# Patient Record
Sex: MALE | Race: OTHER | HISPANIC OR LATINO
[De-identification: names, ages, dates, MRNs, and addresses within clinical notes are randomized per-mention and may not be internally consistent; named-entity substitution may affect disease eponyms.]

---

## 2017-01-12 ENCOUNTER — MEDICATION RENEWAL (OUTPATIENT)
Age: 52
End: 2017-01-12

## 2017-02-09 ENCOUNTER — TRANSCRIPTION ENCOUNTER (OUTPATIENT)
Age: 52
End: 2017-02-09

## 2017-04-16 ENCOUNTER — TRANSCRIPTION ENCOUNTER (OUTPATIENT)
Age: 52
End: 2017-04-16

## 2017-07-01 ENCOUNTER — TRANSCRIPTION ENCOUNTER (OUTPATIENT)
Age: 52
End: 2017-07-01

## 2017-07-29 ENCOUNTER — TRANSCRIPTION ENCOUNTER (OUTPATIENT)
Age: 52
End: 2017-07-29

## 2017-08-17 ENCOUNTER — APPOINTMENT (OUTPATIENT)
Dept: UROLOGY | Facility: CLINIC | Age: 52
End: 2017-08-17

## 2017-09-04 ENCOUNTER — TRANSCRIPTION ENCOUNTER (OUTPATIENT)
Age: 52
End: 2017-09-04

## 2017-09-23 ENCOUNTER — TRANSCRIPTION ENCOUNTER (OUTPATIENT)
Age: 52
End: 2017-09-23

## 2018-01-04 ENCOUNTER — TRANSCRIPTION ENCOUNTER (OUTPATIENT)
Age: 53
End: 2018-01-04

## 2018-04-20 ENCOUNTER — TRANSCRIPTION ENCOUNTER (OUTPATIENT)
Age: 53
End: 2018-04-20

## 2018-05-13 ENCOUNTER — TRANSCRIPTION ENCOUNTER (OUTPATIENT)
Age: 53
End: 2018-05-13

## 2018-10-08 ENCOUNTER — TRANSCRIPTION ENCOUNTER (OUTPATIENT)
Age: 53
End: 2018-10-08

## 2021-05-12 ENCOUNTER — EMERGENCY (EMERGENCY)
Facility: HOSPITAL | Age: 56
LOS: 1 days | Discharge: HOME | End: 2021-05-12
Attending: EMERGENCY MEDICINE | Admitting: EMERGENCY MEDICINE
Payer: COMMERCIAL

## 2021-05-12 VITALS
DIASTOLIC BLOOD PRESSURE: 73 MMHG | SYSTOLIC BLOOD PRESSURE: 132 MMHG | RESPIRATION RATE: 16 BRPM | OXYGEN SATURATION: 100 % | HEIGHT: 65 IN | HEART RATE: 81 BPM | TEMPERATURE: 98 F

## 2021-05-12 DIAGNOSIS — Z98.89 OTHER SPECIFIED POSTPROCEDURAL STATES: Chronic | ICD-10-CM

## 2021-05-12 LAB
ALBUMIN SERPL ELPH-MCNC: 4 G/DL — SIGNIFICANT CHANGE UP (ref 3.3–5)
ALP SERPL-CCNC: 68 U/L — SIGNIFICANT CHANGE UP (ref 40–120)
ALT FLD-CCNC: 13 U/L — SIGNIFICANT CHANGE UP (ref 4–41)
ANION GAP SERPL CALC-SCNC: 11 MMOL/L — SIGNIFICANT CHANGE UP (ref 7–14)
APPEARANCE UR: CLEAR — SIGNIFICANT CHANGE UP
AST SERPL-CCNC: 20 U/L — SIGNIFICANT CHANGE UP (ref 4–40)
BASOPHILS # BLD AUTO: 0.06 K/UL — SIGNIFICANT CHANGE UP (ref 0–0.2)
BASOPHILS NFR BLD AUTO: 0.7 % — SIGNIFICANT CHANGE UP (ref 0–2)
BILIRUB SERPL-MCNC: 0.8 MG/DL — SIGNIFICANT CHANGE UP (ref 0.2–1.2)
BILIRUB UR-MCNC: NEGATIVE — SIGNIFICANT CHANGE UP
BUN SERPL-MCNC: 15 MG/DL — SIGNIFICANT CHANGE UP (ref 7–23)
CALCIUM SERPL-MCNC: 9 MG/DL — SIGNIFICANT CHANGE UP (ref 8.4–10.5)
CHLORIDE SERPL-SCNC: 100 MMOL/L — SIGNIFICANT CHANGE UP (ref 98–107)
CO2 SERPL-SCNC: 28 MMOL/L — SIGNIFICANT CHANGE UP (ref 22–31)
COLOR SPEC: YELLOW — SIGNIFICANT CHANGE UP
CREAT SERPL-MCNC: 0.88 MG/DL — SIGNIFICANT CHANGE UP (ref 0.5–1.3)
DIFF PNL FLD: NEGATIVE — SIGNIFICANT CHANGE UP
EOSINOPHIL # BLD AUTO: 0.21 K/UL — SIGNIFICANT CHANGE UP (ref 0–0.5)
EOSINOPHIL NFR BLD AUTO: 2.6 % — SIGNIFICANT CHANGE UP (ref 0–6)
GLUCOSE SERPL-MCNC: 106 MG/DL — HIGH (ref 70–99)
GLUCOSE UR QL: NEGATIVE — SIGNIFICANT CHANGE UP
HCT VFR BLD CALC: 41 % — SIGNIFICANT CHANGE UP (ref 39–50)
HGB BLD-MCNC: 14.2 G/DL — SIGNIFICANT CHANGE UP (ref 13–17)
HIV 1+2 AB+HIV1 P24 AG SERPL QL IA: SIGNIFICANT CHANGE UP
IANC: 4.82 K/UL — SIGNIFICANT CHANGE UP (ref 1.5–8.5)
IMM GRANULOCYTES NFR BLD AUTO: 0.4 % — SIGNIFICANT CHANGE UP (ref 0–1.5)
KETONES UR-MCNC: NEGATIVE — SIGNIFICANT CHANGE UP
LEUKOCYTE ESTERASE UR-ACNC: NEGATIVE — SIGNIFICANT CHANGE UP
LYMPHOCYTES # BLD AUTO: 2.2 K/UL — SIGNIFICANT CHANGE UP (ref 1–3.3)
LYMPHOCYTES # BLD AUTO: 27.2 % — SIGNIFICANT CHANGE UP (ref 13–44)
MCHC RBC-ENTMCNC: 32 PG — SIGNIFICANT CHANGE UP (ref 27–34)
MCHC RBC-ENTMCNC: 34.6 GM/DL — SIGNIFICANT CHANGE UP (ref 32–36)
MCV RBC AUTO: 92.3 FL — SIGNIFICANT CHANGE UP (ref 80–100)
MONOCYTES # BLD AUTO: 0.77 K/UL — SIGNIFICANT CHANGE UP (ref 0–0.9)
MONOCYTES NFR BLD AUTO: 9.5 % — SIGNIFICANT CHANGE UP (ref 2–14)
NEUTROPHILS # BLD AUTO: 4.82 K/UL — SIGNIFICANT CHANGE UP (ref 1.8–7.4)
NEUTROPHILS NFR BLD AUTO: 59.6 % — SIGNIFICANT CHANGE UP (ref 43–77)
NITRITE UR-MCNC: NEGATIVE — SIGNIFICANT CHANGE UP
NRBC # BLD: 0 /100 WBCS — SIGNIFICANT CHANGE UP
NRBC # FLD: 0 K/UL — SIGNIFICANT CHANGE UP
PH UR: 6 — SIGNIFICANT CHANGE UP (ref 5–8)
PLATELET # BLD AUTO: 177 K/UL — SIGNIFICANT CHANGE UP (ref 150–400)
POTASSIUM SERPL-MCNC: 3 MMOL/L — LOW (ref 3.5–5.3)
POTASSIUM SERPL-SCNC: 3 MMOL/L — LOW (ref 3.5–5.3)
PROT SERPL-MCNC: 6.8 G/DL — SIGNIFICANT CHANGE UP (ref 6–8.3)
PROT UR-MCNC: ABNORMAL
RBC # BLD: 4.44 M/UL — SIGNIFICANT CHANGE UP (ref 4.2–5.8)
RBC # FLD: 12.5 % — SIGNIFICANT CHANGE UP (ref 10.3–14.5)
SODIUM SERPL-SCNC: 139 MMOL/L — SIGNIFICANT CHANGE UP (ref 135–145)
SP GR SPEC: 1.02 — SIGNIFICANT CHANGE UP (ref 1.01–1.02)
UROBILINOGEN FLD QL: SIGNIFICANT CHANGE UP
WBC # BLD: 8.09 K/UL — SIGNIFICANT CHANGE UP (ref 3.8–10.5)
WBC # FLD AUTO: 8.09 K/UL — SIGNIFICANT CHANGE UP (ref 3.8–10.5)

## 2021-05-12 PROCEDURE — 99284 EMERGENCY DEPT VISIT MOD MDM: CPT | Mod: 25

## 2021-05-12 PROCEDURE — 93010 ELECTROCARDIOGRAM REPORT: CPT

## 2021-05-12 NOTE — ED PROVIDER NOTE - FAMILY HISTORY
Father  Still living? Unknown  Family history of prostate cancer, Age at diagnosis: Age Unknown     Grandparent  Still living? Unknown  Family history of prostate cancer, Age at diagnosis: Age Unknown

## 2021-05-12 NOTE — ED ADULT TRIAGE NOTE - CHIEF COMPLAINT QUOTE
Pt. with c/o RLQ  radiating to belly button. started yesterday on/off and continues since last night. Last normal BM yesterday. denies n/v.

## 2021-05-12 NOTE — ED PROVIDER NOTE - ATTENDING CONTRIBUTION TO CARE
I performed a face to face evaluation of this patient and obtained a history and performed a full exam.  I agree with the history, physical exam and plan of the PA.    Brief HPI:  57 yo M pmh HTN presents to ED with periumbilical abdominal pain radiating to RLQ only in the evening for the past 2 days.  Intermittent, sharp, no aggravating or alleviating factors.  No n/v/f/c, dysuria, hematuria, back pain, diarrhea, bloody or dark stool.      Vitals:   Reviewed    Exam:    GEN:  Non-toxic appearing, non-distressed, speaking full sentences, non-diaphoretic, AAOx3  HEENT:  NCAT, neck supple, EOMI, PERRLA, sclera anicteric, no conjunctival pallor or injection, no stridor, normal voice, no tonsillar exudate  CV:  regular rhythm and rate, s1/s2 audible, no murmurs, rubs or gallops, peripheral pulses 2+ and symmetric  PULM:  non-labored respirations, lungs clear to auscultation bilaterally, no wheezes, crackles or rales  ABD:  non distended, non-tender, no rebound, no guarding, negative Sibley's sign, bowel sounds normal, no cvat  MSK:  no gross deformity, non-tender extremities and joints, range of motion grossly normal appearing, no extremity edema, extremities warm and well perfused   NEURO:  AAOx3, CN II-XII intact, motor 5/5 in upper and lower extremities bilaterally, sensation grossly intact in extremities and trunk  SKIN:  warm, dry, no rash or vesicles   :  See pa note     A/P:  57 yo male c pmhx HTN presents to ED c/o periumbilical abdominal pain radiating to RLQ only in the evening for the past 2 days.  Patient is not experiencing pain currently in ED.  Abdomen is non-tender,  exam unremarkable.  Low c/f acute surgical pathology of abdomen or gu system.  Plan for labs, ua.  Dispo pending.

## 2021-05-12 NOTE — ED PROVIDER NOTE - CLINICAL SUMMARY MEDICAL DECISION MAKING FREE TEXT BOX
55 yo male c pmhx HTN, kidney stones presents to ED c/o periumbilical abdominal pain radiating to RLQ only in the evening for the past 2 days.  Abdomen soft, nontender,  nondistended, pt has no n/v/d or  sx well appearing and in NAD.  No indication for CT scan at this time, will check labs and UA and reassess.

## 2021-05-12 NOTE — ED PROVIDER NOTE - OBJECTIVE STATEMENT
55 yo male c pmhx HTN presents to ED c/o periumbilical abdominal pain radiating to RLQ only in the evening for the past 2 days.  Pt states pain is sharp and he only happens in the evening.  Pt denies any n/v/d, Cp, sob, flank pain, urinary sx.

## 2021-05-12 NOTE — ED PROVIDER NOTE - PMH
Atrial fibrillation  paroxismal  BPH with elevated PSA  Last PSA 1.25  Cardiomegaly    Cardiomyopathy  MILD LVD on ECHO from FEb of 2012  History of tonsillectomy    HTN (hypertension)    Nephrolithiasis    RBBB (right bundle branch block)    Rotator cuff injury  Lt  with surgical correction

## 2021-05-12 NOTE — ED ADULT NURSE NOTE - OBJECTIVE STATEMENT
Received patient to intake spot 10, A&OX4, ambulatory. Pt. c/o sharp intermittent RLQ pain that radiates to the belly button since last night. Pt. denies SOB, chest pain, fevers, chills, n/v, diarrhea, or urinary symptoms. Respirations appear even and unlabored. PHx HTN and kidney stones. Comfort measures in place. Awaiting further orders. Will continue to monitor.

## 2021-05-12 NOTE — ED PROVIDER NOTE - NSFOLLOWUPINSTRUCTIONS_ED_ALL_ED_FT
Drink plenty of fluids,  Avoid any strenuous activity.  Follow up with your PCP in 2 days.  Return to Ed for any worsening pain, vomiting, fever or any other concerning symptoms.

## 2021-05-12 NOTE — ED PROVIDER NOTE - PATIENT PORTAL LINK FT
You can access the FollowMyHealth Patient Portal offered by [facility] by registering at the following website: [email or web address]. By joining [employer]’s FollowMyHealth portal, you will also be able to view your health information using other applications (apps) compatible with our system.

## 2022-10-28 ENCOUNTER — APPOINTMENT (OUTPATIENT)
Dept: UROLOGY | Facility: CLINIC | Age: 57
End: 2022-10-28

## 2022-10-28 VITALS
RESPIRATION RATE: 17 BRPM | DIASTOLIC BLOOD PRESSURE: 82 MMHG | WEIGHT: 205 LBS | SYSTOLIC BLOOD PRESSURE: 130 MMHG | OXYGEN SATURATION: 100 % | HEART RATE: 79 BPM | HEIGHT: 65 IN | BODY MASS INDEX: 34.16 KG/M2

## 2022-10-28 DIAGNOSIS — N20.0 CALCULUS OF KIDNEY: ICD-10-CM

## 2022-10-28 DIAGNOSIS — N40.1 BENIGN PROSTATIC HYPERPLASIA WITH LOWER URINARY TRACT SYMPMS: ICD-10-CM

## 2022-10-28 LAB
BILIRUB UR QL STRIP: NEGATIVE
CLARITY UR: CLEAR
COLLECTION METHOD: NORMAL
GLUCOSE UR-MCNC: NEGATIVE
HCG UR QL: 0.2 EU/DL
HGB UR QL STRIP.AUTO: NEGATIVE
KETONES UR-MCNC: NEGATIVE
LEUKOCYTE ESTERASE UR QL STRIP: NEGATIVE
NITRITE UR QL STRIP: NEGATIVE
PH UR STRIP: 6
PROT UR STRIP-MCNC: NEGATIVE
SP GR UR STRIP: 1.02

## 2022-10-28 PROCEDURE — 99204 OFFICE O/P NEW MOD 45 MIN: CPT

## 2022-10-28 NOTE — HISTORY OF PRESENT ILLNESS
[FreeTextEntry1] : Patient is a 56 yo M who presents for kidney stones and prostate check.\par \par No significant LUTS - except for urinary frequency from drinking lots of water.  He has 2 scanned Litholinks in 2013, 2015 - mainly hypercalciuria.  Normal volume, UA, citrate.\par \par He had 3 URS/ESWLs in the past.  He states he was given triamterene-HCTZ, allopurinol.  He has been taking all along and no further stones.  He was following with urologist near him, Dr Cm.  He does not want to stop taking these two medications.\par \par AUA SS 6, bother 1.  States he has h/o prostatitis in the past.  Family hx +prostate ca in father, treated w brachy.\par \par Currently asymptomatic.

## 2022-10-28 NOTE — ASSESSMENT
[FreeTextEntry1] : Patient is a 58 yo M who presents for kidney stones, BPH.\par \par PSA\par Renal ULT\par Refer to Nephrology/Dr Villatoro for stone mgmt - he would to continue with his triamterene-hctz, allopurinol.  D/w pt that unclear if he needs to be on allopurinol or these medications based on his Litholink.  D/w pt that will renew until he sees Dr Villatoro who is a specialist in stone medical mgmt\par F/u 1 yr

## 2022-10-28 NOTE — REVIEW OF SYSTEMS
[Constipation] : constipation [Diarrhea] : diarrhea [Heartburn] : heartburn [Joint Pain] : joint pain [see HPI] : see HPI [Negative] : Heme/Lymph [FreeTextEntry2] : high blood pressure

## 2022-11-02 ENCOUNTER — APPOINTMENT (OUTPATIENT)
Age: 57
End: 2022-11-02

## 2022-11-02 LAB — PSA SERPL-MCNC: 3.08 NG/ML

## 2022-11-15 ENCOUNTER — APPOINTMENT (OUTPATIENT)
Dept: NEPHROLOGY | Facility: CLINIC | Age: 57
End: 2022-11-15

## 2023-05-10 ENCOUNTER — NON-APPOINTMENT (OUTPATIENT)
Age: 58
End: 2023-05-10

## 2023-07-05 ENCOUNTER — NON-APPOINTMENT (OUTPATIENT)
Age: 58
End: 2023-07-05

## 2023-07-14 ENCOUNTER — NON-APPOINTMENT (OUTPATIENT)
Age: 58
End: 2023-07-14

## 2023-10-31 ENCOUNTER — APPOINTMENT (OUTPATIENT)
Dept: UROLOGY | Facility: CLINIC | Age: 58
End: 2023-10-31

## 2023-12-20 ENCOUNTER — NON-APPOINTMENT (OUTPATIENT)
Age: 58
End: 2023-12-20

## 2024-01-25 ENCOUNTER — NON-APPOINTMENT (OUTPATIENT)
Age: 59
End: 2024-01-25

## 2024-02-27 ENCOUNTER — NON-APPOINTMENT (OUTPATIENT)
Age: 59
End: 2024-02-27

## 2024-05-03 ENCOUNTER — NON-APPOINTMENT (OUTPATIENT)
Age: 59
End: 2024-05-03

## 2024-08-14 ENCOUNTER — NON-APPOINTMENT (OUTPATIENT)
Age: 59
End: 2024-08-14

## 2024-10-19 ENCOUNTER — NON-APPOINTMENT (OUTPATIENT)
Age: 59
End: 2024-10-19

## 2024-12-20 ENCOUNTER — APPOINTMENT (OUTPATIENT)
Dept: UROLOGY | Facility: CLINIC | Age: 59
End: 2024-12-20
Payer: COMMERCIAL

## 2024-12-20 PROCEDURE — G2211 COMPLEX E/M VISIT ADD ON: CPT | Mod: NC

## 2024-12-20 PROCEDURE — 99214 OFFICE O/P EST MOD 30 MIN: CPT

## 2024-12-22 LAB
APPEARANCE: CLEAR
BACTERIA UR CULT: NORMAL
BACTERIA: NEGATIVE /HPF
BILIRUBIN URINE: NEGATIVE
BLOOD URINE: NEGATIVE
CAST: 0 /LPF
COLOR: YELLOW
EPITHELIAL CELLS: 0 /HPF
GLUCOSE QUALITATIVE U: NEGATIVE MG/DL
KETONES URINE: NEGATIVE MG/DL
LEUKOCYTE ESTERASE URINE: ABNORMAL
MICROSCOPIC-UA: NORMAL
NITRITE URINE: NEGATIVE
PH URINE: 7
PROTEIN URINE: NORMAL MG/DL
RED BLOOD CELLS URINE: 1 /HPF
SPECIFIC GRAVITY URINE: 1.02
UROBILINOGEN URINE: 1 MG/DL
WHITE BLOOD CELLS URINE: 1 /HPF

## 2024-12-26 ENCOUNTER — OUTPATIENT (OUTPATIENT)
Dept: OUTPATIENT SERVICES | Facility: HOSPITAL | Age: 59
LOS: 1 days | End: 2024-12-26
Payer: COMMERCIAL

## 2024-12-26 ENCOUNTER — APPOINTMENT (OUTPATIENT)
Dept: CT IMAGING | Facility: IMAGING CENTER | Age: 59
End: 2024-12-26
Payer: COMMERCIAL

## 2024-12-26 DIAGNOSIS — N20.0 CALCULUS OF KIDNEY: ICD-10-CM

## 2024-12-26 DIAGNOSIS — Z98.89 OTHER SPECIFIED POSTPROCEDURAL STATES: Chronic | ICD-10-CM

## 2024-12-26 PROCEDURE — 74176 CT ABD & PELVIS W/O CONTRAST: CPT | Mod: 26

## 2024-12-26 PROCEDURE — 74176 CT ABD & PELVIS W/O CONTRAST: CPT

## 2025-01-12 ENCOUNTER — NON-APPOINTMENT (OUTPATIENT)
Age: 60
End: 2025-01-12

## 2025-01-22 ENCOUNTER — OUTPATIENT (OUTPATIENT)
Dept: OUTPATIENT SERVICES | Facility: HOSPITAL | Age: 60
LOS: 1 days | End: 2025-01-22
Payer: COMMERCIAL

## 2025-01-22 VITALS
RESPIRATION RATE: 16 BRPM | WEIGHT: 190.04 LBS | SYSTOLIC BLOOD PRESSURE: 116 MMHG | OXYGEN SATURATION: 97 % | TEMPERATURE: 98 F | HEART RATE: 93 BPM | DIASTOLIC BLOOD PRESSURE: 72 MMHG | HEIGHT: 65 IN

## 2025-01-22 DIAGNOSIS — N20.0 CALCULUS OF KIDNEY: ICD-10-CM

## 2025-01-22 DIAGNOSIS — I42.9 CARDIOMYOPATHY, UNSPECIFIED: ICD-10-CM

## 2025-01-22 DIAGNOSIS — Z01.818 ENCOUNTER FOR OTHER PREPROCEDURAL EXAMINATION: ICD-10-CM

## 2025-01-22 DIAGNOSIS — Z98.89 OTHER SPECIFIED POSTPROCEDURAL STATES: Chronic | ICD-10-CM

## 2025-01-22 PROCEDURE — 87086 URINE CULTURE/COLONY COUNT: CPT

## 2025-01-22 PROCEDURE — G0463: CPT

## 2025-01-22 RX ORDER — SODIUM CHLORIDE 9 G/ML
1000 INJECTION, SOLUTION INTRAVENOUS
Refills: 0 | Status: DISCONTINUED | OUTPATIENT
Start: 2025-02-12 | End: 2025-02-26

## 2025-01-22 RX ORDER — BACTERIOSTATIC SODIUM CHLORIDE 0.9 %
3 VIAL (ML) INJECTION EVERY 8 HOURS
Refills: 0 | Status: DISCONTINUED | OUTPATIENT
Start: 2025-02-12 | End: 2025-02-26

## 2025-01-22 NOTE — H&P PST ADULT - ASSESSMENT
DASI Score: 6.70  able to go up one flight of stairs or walk 1-2 blocks, Perform all ADLs, shopping, chores, without difficulty, SOB, CP.  Loose teeth: denies loose teeth or dentures.

## 2025-01-22 NOTE — H&P PST ADULT - HISTORY OF PRESENT ILLNESS
60y/o M, PMHx of HTN, GERD, Arthritis, occasional PVCs, RBBB, cardiomyopathy (on ASA 81mg), Hx of kidney stones.  PT went to urgent care 1/12/20245 with c/o right flank pain x 2 months.  Pt was more painful lately and unable to sleep.  Noticed BP elevated when he lays down.  Pain 3/10 more discomfort, achy.  Pt denies any F/C/N/V/D, SOB, CP, palpitations, dizziness, HA, urinary frequency, urgency, dysuria.  Pt scheduled for right ESWL with Dr. Marty Martin on 2/12/2025.

## 2025-01-22 NOTE — H&P PST ADULT - NSICDXPASTSURGICALHX_GEN_ALL_CORE_FT
What Type Of Note Output Would You Prefer (Optional)?: Bullet Format Is This A New Presentation, Or A Follow-Up?: Skin Lesion PAST SURGICAL HISTORY:  H/O arthroscopy of shoulder Right    H/O lithotripsy     History of tonsillectomy

## 2025-01-22 NOTE — H&P PST ADULT - PROBLEM SELECTOR PLAN 1
Pt scheduled for Right ESWlL with Dr. Marty Martin on 2/12/2025.  -Pre op instructions provided.  LABS: urine culture done at [facility].  Blood work received ( 01/14/2025).  May continue on medications as Rx.  PT has appt tomorrow with PMD 1/23/2025 for M/E.

## 2025-01-22 NOTE — H&P PST ADULT - NSICDXPASTMEDICALHX_GEN_ALL_CORE_FT
PAST MEDICAL HISTORY:  Atrial fibrillation paroxismal    BPH with elevated PSA Last PSA 1.25    Cardiomegaly     Cardiomyopathy MILD LVD on ECHO from FEb of 2012    History of tonsillectomy     HTN (hypertension)     Nephrolithiasis     RBBB (right bundle branch block)     Rotator cuff injury Lt  with surgical correction

## 2025-01-22 NOTE — H&P PST ADULT - NSICDXFAMILYHX_GEN_ALL_CORE_FT
FAMILY HISTORY:  Father  Still living? Unknown  Family history of prostate cancer, Age at diagnosis: Age Unknown    Grandparent  Still living? Unknown  Family history of prostate cancer, Age at diagnosis: Age Unknown

## 2025-01-23 LAB
CULTURE RESULTS: SIGNIFICANT CHANGE UP
SPECIMEN SOURCE: SIGNIFICANT CHANGE UP

## 2025-02-08 ENCOUNTER — NON-APPOINTMENT (OUTPATIENT)
Age: 60
End: 2025-02-08

## 2025-02-12 ENCOUNTER — APPOINTMENT (OUTPATIENT)
Dept: UROLOGY | Facility: HOSPITAL | Age: 60
End: 2025-02-12

## 2025-02-12 ENCOUNTER — TRANSCRIPTION ENCOUNTER (OUTPATIENT)
Age: 60
End: 2025-02-12

## 2025-02-12 ENCOUNTER — OUTPATIENT (OUTPATIENT)
Dept: OUTPATIENT SERVICES | Facility: HOSPITAL | Age: 60
LOS: 1 days | End: 2025-02-12
Payer: COMMERCIAL

## 2025-02-12 VITALS
SYSTOLIC BLOOD PRESSURE: 123 MMHG | RESPIRATION RATE: 12 BRPM | HEART RATE: 76 BPM | DIASTOLIC BLOOD PRESSURE: 68 MMHG | OXYGEN SATURATION: 96 %

## 2025-02-12 VITALS
HEART RATE: 77 BPM | SYSTOLIC BLOOD PRESSURE: 122 MMHG | HEIGHT: 65 IN | DIASTOLIC BLOOD PRESSURE: 71 MMHG | TEMPERATURE: 97 F | OXYGEN SATURATION: 97 % | RESPIRATION RATE: 18 BRPM | WEIGHT: 190.04 LBS

## 2025-02-12 DIAGNOSIS — N20.0 CALCULUS OF KIDNEY: ICD-10-CM

## 2025-02-12 DIAGNOSIS — Z98.89 OTHER SPECIFIED POSTPROCEDURAL STATES: Chronic | ICD-10-CM

## 2025-02-12 PROCEDURE — 50590 FRAGMENTING OF KIDNEY STONE: CPT | Mod: RT

## 2025-02-12 RX ORDER — OXYCODONE HYDROCHLORIDE 30 MG/1
10 TABLET ORAL ONCE
Refills: 0 | Status: DISCONTINUED | OUTPATIENT
Start: 2025-02-12 | End: 2025-02-12

## 2025-02-12 RX ORDER — CEFAZOLIN SODIUM IN 0.9 % NACL 2 G/10 ML
2000 SYRINGE (ML) INTRAVENOUS ONCE
Refills: 0 | Status: COMPLETED | OUTPATIENT
Start: 2025-02-12 | End: 2025-02-12

## 2025-02-12 RX ORDER — FENTANYL CITRATE 50 UG/ML
25 INJECTION INTRAMUSCULAR; INTRAVENOUS
Refills: 0 | Status: DISCONTINUED | OUTPATIENT
Start: 2025-02-12 | End: 2025-02-12

## 2025-02-12 RX ORDER — LIDOCAINE HYDROCHLORIDE 10 MG/ML
0.2 INJECTION EPIDURAL; INFILTRATION; INTRACAUDAL ONCE
Refills: 0 | Status: COMPLETED | OUTPATIENT
Start: 2025-02-12 | End: 2025-02-12

## 2025-02-12 RX ORDER — ONDANSETRON 4 MG/1
4 TABLET, ORALLY DISINTEGRATING ORAL ONCE
Refills: 0 | Status: DISCONTINUED | OUTPATIENT
Start: 2025-02-12 | End: 2025-02-26

## 2025-02-12 RX ADMIN — SODIUM CHLORIDE 100 MILLILITER(S): 9 INJECTION, SOLUTION INTRAVENOUS at 07:18

## 2025-02-12 RX ADMIN — Medication 3 MILLILITER(S): at 07:16

## 2025-02-12 NOTE — ASU DISCHARGE PLAN (ADULT/PEDIATRIC) - CARE PROVIDER_API CALL
Marty Martin  Urology  [address] [place] [address], [facility] [address]  [place] [address]  Phone: [phone]  Fax: [phone]  Follow Up Time: Routine

## 2025-02-12 NOTE — ASU DISCHARGE PLAN (ADULT/PEDIATRIC) - FINANCIAL ASSISTANCE
[facility] provides services at a reduced cost to those who are determined to be eligible through [facility]’s financial assistance program. Information regarding [facility]’s financial assistance program can be found by going to [email or web address] or by calling [phone].

## 2025-02-12 NOTE — ASU DISCHARGE PLAN (ADULT/PEDIATRIC) - ASU DC SPECIAL INSTRUCTIONSFT
It is common to have blood in the urine after your procedure.  It may be pink or even red; inform your doctor if you have a significant amount of clots in the urine or if you are unable to void at all.  Be sure to drink plenty of fluids at all times.    -It is not uncommon to have some burning when you urinate or to even notice some stone fragments in your urine.  -Please strain your urine and bring any fragments to your follow-up appointment for stone analysis.  -Drink at least 6-8 glasses of fluid per day; minimize night-time drinking if this causes you to awaken regularly to urinate  -You may resume your regular diet and regular medication regimen.    -You may shower or bathe.    -You may take over the counter pain medications such as Tylenol, do not exceed 4 grams daily.  If you have severe pain that does not improve with the pain medication or you have persistent vomiting, call your doctor.  -As you have just underwent general anesthesia, you should refrain from driving, heavy lifting, smoking, alcohol consumption, or important decision making for the next 24 hours.  You may climb stairs and you may resume sexual activity.  -Call your physician if you have a fever over 101F.  -Make a follow up appointment for with your urologist when you arrive home (or the next business day).  -Call your urologist during normal business hours with any other routine questions.

## 2025-02-12 NOTE — ASU PATIENT PROFILE, ADULT - NSICDXPASTSURGICALHX_GEN_ALL_CORE_FT
PAST SURGICAL HISTORY:  H/O arthroscopy of shoulder Right    H/O lithotripsy     History of tonsillectomy

## 2025-02-12 NOTE — ASU DISCHARGE PLAN (ADULT/PEDIATRIC) - NURSING INSTRUCTIONS
Your first dose of Tylenol was given at _____ 08:50 AM______. Next dose of Tylenol will be on or after _______2:50 PM____ ,today/tonight and every 6 hours afterwards as needed for pain management, do not take any Tylenol containing products until this time.  Do not exceed more than 4000mg of Tylenol in one 24 hour setting. If no contraindications, you may alternate with Ibuprofen  3 hours after dose of Tylenol. Ibuprofen can be taken every 6 hours.

## 2025-02-12 NOTE — PRE-ANESTHESIA EVALUATION ADULT - NSANTHPMHFT_GEN_ALL_CORE
hx of cardiomyopathy - no cp/sob  no s/s chf, fluid overload  >4 METS  RBBB -  stable  No GERD  Chronic sinus congestion

## 2025-02-22 ENCOUNTER — NON-APPOINTMENT (OUTPATIENT)
Age: 60
End: 2025-02-22

## 2025-05-08 ENCOUNTER — APPOINTMENT (OUTPATIENT)
Dept: UROLOGY | Facility: CLINIC | Age: 60
End: 2025-05-08
Payer: COMMERCIAL

## 2025-05-08 DIAGNOSIS — N20.0 CALCULUS OF KIDNEY: ICD-10-CM

## 2025-05-08 PROCEDURE — 76775 US EXAM ABDO BACK WALL LIM: CPT

## 2025-05-08 PROCEDURE — 99024 POSTOP FOLLOW-UP VISIT: CPT

## 2025-06-20 NOTE — ASU DISCHARGE PLAN (ADULT/PEDIATRIC) - FOR NEXT 24 HOURS DO NOT:
Addended by: WILLEM GUERRA on: 6/20/2025 10:21 AM     Modules accepted: Orders     Statement Selected

## (undated) DEVICE — VENODYNE/SCD SLEEVE CALF MEDIUM

## (undated) DEVICE — WARMING BLANKET FULL ADULT

## (undated) DEVICE — POSITIONER FOAM EGG CRATE ULNAR 2PCS (PINK)